# Patient Record
Sex: MALE | Race: BLACK OR AFRICAN AMERICAN | Employment: OTHER | URBAN - METROPOLITAN AREA
[De-identification: names, ages, dates, MRNs, and addresses within clinical notes are randomized per-mention and may not be internally consistent; named-entity substitution may affect disease eponyms.]

---

## 2024-03-04 DIAGNOSIS — J61 ASBESTOSIS (HCC): Primary | ICD-10-CM

## 2024-03-15 ENCOUNTER — OFFICE VISIT (OUTPATIENT)
Dept: PULMONOLOGY | Age: 79
End: 2024-03-15

## 2024-03-15 ENCOUNTER — HOSPITAL ENCOUNTER (OUTPATIENT)
Dept: GENERAL RADIOLOGY | Age: 79
Discharge: HOME | End: 2024-03-15
Payer: COMMERCIAL

## 2024-03-15 VITALS
HEIGHT: 68 IN | RESPIRATION RATE: 14 BRPM | HEART RATE: 103 BPM | WEIGHT: 175 LBS | DIASTOLIC BLOOD PRESSURE: 81 MMHG | BODY MASS INDEX: 26.52 KG/M2 | TEMPERATURE: 97.6 F | SYSTOLIC BLOOD PRESSURE: 124 MMHG | OXYGEN SATURATION: 99 %

## 2024-03-15 DIAGNOSIS — Z87.09 HISTORY OF ASBESTOSIS: Primary | ICD-10-CM

## 2024-03-15 DIAGNOSIS — Z77.090 ASBESTOS EXPOSURE: ICD-10-CM

## 2024-03-15 DIAGNOSIS — F17.210 NICOTINE DEPENDENCE, CIGARETTES, UNCOMPLICATED: ICD-10-CM

## 2024-03-15 DIAGNOSIS — J61 ASBESTOSIS (HCC): ICD-10-CM

## 2024-03-15 DIAGNOSIS — J44.9 CHRONIC OBSTRUCTIVE PULMONARY DISEASE, UNSPECIFIED COPD TYPE (HCC): ICD-10-CM

## 2024-03-15 LAB
EXPIRATORY TIME: NORMAL
FEF 25-75% %PRED-PRE: NORMAL
FEF 25-75% PRED: NORMAL
FEF 25-75-PRE: NORMAL
FEV1 %PRED-PRE: 60 %
FEV1 PRED: 2.39 L
FEV1/FVC %PRED-PRE: NORMAL
FEV1/FVC PRED: NORMAL
FEV1/FVC: 67 %
FEV1: 1.44 L
FVC %PRED-PRE: 68 %
FVC PRED: 3.16 L
FVC: 2.14 L
PEF %PRED-PRE: NORMAL
PEF PRED: NORMAL
PEF-PRE: NORMAL

## 2024-03-15 PROCEDURE — 71046 X-RAY EXAM CHEST 2 VIEWS: CPT

## 2024-03-15 RX ORDER — SPIRONOLACTONE 25 MG/1
25 TABLET ORAL DAILY
COMMUNITY

## 2024-03-15 RX ORDER — FAMOTIDINE 20 MG/1
20 TABLET, FILM COATED ORAL 2 TIMES DAILY
COMMUNITY

## 2024-03-15 RX ORDER — ALBUTEROL SULFATE 90 UG/1
2 AEROSOL, METERED RESPIRATORY (INHALATION) EVERY 6 HOURS PRN
Qty: 1 EACH | Refills: 11 | Status: SHIPPED | OUTPATIENT
Start: 2024-03-15

## 2024-03-15 RX ORDER — TORSEMIDE 20 MG/1
20 TABLET ORAL DAILY
COMMUNITY

## 2024-03-15 RX ORDER — FLUTICASONE FUROATE, UMECLIDINIUM BROMIDE AND VILANTEROL TRIFENATATE 100; 62.5; 25 UG/1; UG/1; UG/1
1 POWDER RESPIRATORY (INHALATION) DAILY
Qty: 1 EACH | Refills: 11 | Status: SHIPPED | OUTPATIENT
Start: 2024-03-15

## 2024-03-15 ASSESSMENT — PULMONARY FUNCTION TESTS
FEV1/FVC: 67
FEV1_PREDICTED: 2.39
FVC_PERCENT_PREDICTED_PRE: 68
FVC: 2.14
FVC_PREDICTED: 3.16
FEV1: 1.44
FEV1_PERCENT_PREDICTED_PRE: 60

## 2024-03-15 NOTE — PROGRESS NOTES
[facility]: Patient Office Visit Note  [address]  [place] [address] [phone]    Patient Name:  Baltazar Coburn  YOB: 1945            Date of Service:  3/15/2024    Chief Complaint   Patient presents with    New Patient     Hx of Asbestosis        History of Present Illness:  This is a 78-year-old male with history of atrial fibs status post ablation, hypertension, reflux who has had exposure to asbestos from his job at [employer].  Patient states that he worked for [employer] for 35 years until he retired 14 years ago.  He apparently has been followed by Dr. Becerra and has been getting yearly CAT scans.  Patient is here as a second opinion and he notes that he does have dyspnea on exertion when he walks long distances.  He also notices it when going up a flight of stairs or doing yard work.  There is a cough on a daily basis.  No chest pain.  There is occasional wheezing.  Patient continues to smoke a pack of cigarettes a day.  Previously it was 2 packs a day.  He started at age 20.  Therefore there is probably over 100 pack years of smoking.    No past medical history on file.    Patient Active Problem List   Diagnosis    Pain in joint, shoulder region    Gout, unspecified    Palpitations    Disturbance of skin sensation    Calcaneal spur    Essential hypertension, benign    Tobacco use disorder    Dermatophytosis of foot    Other nonspecific abnormal finding of lung field    Esophageal reflux    Pure hypercholesterolemia    Chronic rhinitis       No past surgical history on file.    Social History     Socioeconomic History    Marital status:      Spouse name: Not on file    Number of children: Not on file    Years of education: Not on file    Highest education level: Not on file   Occupational History    Not on file   Tobacco Use    Smoking status: Former     Current packs/day: 0.50     Average packs/day: 0.5 packs/day for 59.2 years (29.6 ttl

## 2024-08-01 ENCOUNTER — CLINICAL DOCUMENTATION (OUTPATIENT)
Dept: PULMONOLOGY | Age: 79
End: 2024-08-01

## 2024-08-01 NOTE — PROGRESS NOTES
Left message to call for appointment.      Return in about 6 months (around 9/15/2024) for CPFT, With Ayse.